# Patient Record
(demographics unavailable — no encounter records)

---

## 2025-01-02 NOTE — ASSESSMENT
[FreeTextEntry1] : lactose intolerant, drank milk, one episode of NB diarrhea, asymptomatic at this time

## 2025-01-02 NOTE — HISTORY OF PRESENT ILLNESS
[Home] : at home, [unfilled] , at the time of the visit. [Other Location: e.g. Home (Enter Location, City,State)___] : at [unfilled] [Verbal consent obtained from patient] : the patient, [unfilled] [FreeTextEntry3] : Mother Carolee [FreeTextEntry8] : 10 yo male with PMH lactose intolerance for evaluation of one episode of NB diarrhea after drink regular milk. Mother reports patient could not go to school and that school requires a note, requesting excuse note. Reports feels well now.